# Patient Record
Sex: FEMALE | Race: BLACK OR AFRICAN AMERICAN | NOT HISPANIC OR LATINO
[De-identification: names, ages, dates, MRNs, and addresses within clinical notes are randomized per-mention and may not be internally consistent; named-entity substitution may affect disease eponyms.]

---

## 2018-10-01 PROBLEM — Z00.00 ENCOUNTER FOR PREVENTIVE HEALTH EXAMINATION: Status: ACTIVE | Noted: 2018-10-01

## 2018-10-15 ENCOUNTER — OTHER (OUTPATIENT)
Age: 33
End: 2018-10-15

## 2018-10-15 ENCOUNTER — APPOINTMENT (OUTPATIENT)
Dept: PLASTIC SURGERY | Facility: HOSPITAL | Age: 33
End: 2018-10-15

## 2018-10-15 ENCOUNTER — OUTPATIENT (OUTPATIENT)
Dept: OUTPATIENT SERVICES | Facility: HOSPITAL | Age: 33
LOS: 1 days | End: 2018-10-15

## 2018-10-15 VITALS — HEIGHT: 64 IN | BODY MASS INDEX: 45.14 KG/M2

## 2018-10-15 VITALS — DIASTOLIC BLOOD PRESSURE: 67 MMHG | SYSTOLIC BLOOD PRESSURE: 121 MMHG | WEIGHT: 263 LBS

## 2018-10-15 DIAGNOSIS — Z80.3 FAMILY HISTORY OF MALIGNANT NEOPLASM OF BREAST: ICD-10-CM

## 2018-10-16 PROBLEM — Z80.3 FAMILY HISTORY OF MALIGNANT NEOPLASM OF BREAST: Status: ACTIVE | Noted: 2018-10-16

## 2018-10-17 DIAGNOSIS — N62 HYPERTROPHY OF BREAST: ICD-10-CM

## 2018-10-20 ENCOUNTER — APPOINTMENT (OUTPATIENT)
Dept: MAMMOGRAPHY | Facility: CLINIC | Age: 33
End: 2018-10-20
Payer: MEDICAID

## 2018-10-20 ENCOUNTER — OUTPATIENT (OUTPATIENT)
Dept: OUTPATIENT SERVICES | Facility: HOSPITAL | Age: 33
LOS: 1 days | End: 2018-10-20
Payer: COMMERCIAL

## 2018-10-20 DIAGNOSIS — N62 HYPERTROPHY OF BREAST: ICD-10-CM

## 2018-10-20 PROCEDURE — 77063 BREAST TOMOSYNTHESIS BI: CPT

## 2018-10-20 PROCEDURE — 77067 SCR MAMMO BI INCL CAD: CPT

## 2018-10-20 PROCEDURE — 77063 BREAST TOMOSYNTHESIS BI: CPT | Mod: 26

## 2018-10-20 PROCEDURE — 77067 SCR MAMMO BI INCL CAD: CPT | Mod: 26

## 2018-12-11 ENCOUNTER — OUTPATIENT (OUTPATIENT)
Dept: OUTPATIENT SERVICES | Facility: HOSPITAL | Age: 33
LOS: 1 days | End: 2018-12-11

## 2018-12-11 VITALS
TEMPERATURE: 99 F | SYSTOLIC BLOOD PRESSURE: 104 MMHG | HEIGHT: 64 IN | RESPIRATION RATE: 16 BRPM | OXYGEN SATURATION: 98 % | WEIGHT: 263.89 LBS | DIASTOLIC BLOOD PRESSURE: 74 MMHG | HEART RATE: 83 BPM

## 2018-12-11 DIAGNOSIS — N62 HYPERTROPHY OF BREAST: ICD-10-CM

## 2018-12-11 LAB
HCG SERPL-ACNC: < 5 MIU/ML — SIGNIFICANT CHANGE UP
HCT VFR BLD CALC: 40.3 % — SIGNIFICANT CHANGE UP (ref 34.5–45)
HGB BLD-MCNC: 12.7 G/DL — SIGNIFICANT CHANGE UP (ref 11.5–15.5)
MCHC RBC-ENTMCNC: 26.3 PG — LOW (ref 27–34)
MCHC RBC-ENTMCNC: 31.5 % — LOW (ref 32–36)
MCV RBC AUTO: 83.6 FL — SIGNIFICANT CHANGE UP (ref 80–100)
NRBC # FLD: 0 — SIGNIFICANT CHANGE UP
PLATELET # BLD AUTO: 332 K/UL — SIGNIFICANT CHANGE UP (ref 150–400)
PMV BLD: 10.5 FL — SIGNIFICANT CHANGE UP (ref 7–13)
RBC # BLD: 4.82 M/UL — SIGNIFICANT CHANGE UP (ref 3.8–5.2)
RBC # FLD: 13.9 % — SIGNIFICANT CHANGE UP (ref 10.3–14.5)
WBC # BLD: 6.03 K/UL — SIGNIFICANT CHANGE UP (ref 3.8–10.5)
WBC # FLD AUTO: 6.03 K/UL — SIGNIFICANT CHANGE UP (ref 3.8–10.5)

## 2018-12-11 NOTE — H&P PST ADULT - NSANTHOSAYNRD_GEN_A_CORE
No. ROB screening performed.  STOP BANG Legend: 0-2 = LOW Risk; 3-4 = INTERMEDIATE Risk; 5-8 = HIGH Risk

## 2018-12-11 NOTE — H&P PST ADULT - FAMILY HISTORY
Father  Still living? Yes, Estimated age: 65  Hypertension, Age at diagnosis: Age Unknown  Family history of AICD (automatic internal cardiac defibrillator), Age at diagnosis: Age Unknown     Mother  Still living? No  Hypertension, Age at diagnosis: Age Unknown  Cerebrovascular accident, Age at diagnosis: Age Unknown     Grandparent  Still living? No  Cerebrovascular accident, Age at diagnosis: Age Unknown

## 2018-12-11 NOTE — H&P PST ADULT - PROBLEM SELECTOR PLAN 1
Pt. is scheduled for ALBINA breast reduction 12/20/18.  Attempted to speak with Dr. Dozier regarding BMI of 45.3.  Spoke with the [facility] Anesthesiologist Dr. Sweeney.  She stated the surgery has to be done in the main OR.  Attempted to notify the Surgical Coordinatior, Andressa.  Detailed message left with Natasha at the  in the Surgeon's office.

## 2019-01-04 NOTE — H&P PST ADULT - HEART RATE (BEATS/MIN)
Your Child’s Health  Girls over 12      An Richey  January 4, 2019    Visit Vitals  BP 96/58   Pulse 74   Ht 5' 2\" (1.575 m)   Wt 44.7 kg   LMP 12/04/2018   BMI 18.03 kg/m²     Weight: 98.6 lbs      Weight: Adolescence begins about age 10 in girls and ends somewhere in the early twenties. During this time, many changes occur.    BODY CHANGES:  There is no other time of life, except in the first year, when your body grows so rapidly. This is called a \"growth spurt\" and usually precedes and accompanies changes in your body shape as well as changes in your sexual organs. Bones and muscles grow rapidly, fat is added and distributed in an adult manner, and the result is a rapid increase in height and weight. It may even take a while for your coordination and skills to catch up with your rapidly changing body. Your hands and feet are the first to grow, followed by lengthening of your arms and legs. Finally, your spine grows and your rib cage and your pelvis widen.    Various hormones secreted by the endocrine glands lead to development. The first major change is breast development. The second is usually pubic hair (hair between the legs), followed by body odor, hair in the armpits, and finally menstruation (periodic bleeding or periods). In some families, the hair and odor precede the development of the breast tissue.  The whole process takes about two years, although in some cases it may take as long as four or five years.    SEXUAL DEVELOPMENT AND MENSTRUATION:  Breast development may normally begin at any time after the age of 8. One side may begin to grow several months before the other, and unevenness is common, especially in the first year. About two-thirds of adult women are somewhat unequal from one side to the other; small differences are insignificant, but large differences may cause considerable worry later. Bloody or milky discharge from the breast is never normal. Please let us know if this  occurs.    Girls whose breasts begin to develop early will usually grow early, as compared to their friends. Girls who begin to develop later than average (14 years and up) will continue to grow after their friends have stopped.    The second sign of external development is growth of pubic hair, the third is growth of underarm hair, and the last is menstruation. Menstruation occurs about two years after breast development starts.  It may begin earlier or, in rare cases, as much as five years later.  Let us know if you have monthly lower abdominal discomfort or pain without any blood.  Many girls will have a clear mucous vaginal discharge prior to their periods. This is normal, as long as it does not itch, burn, or have a strong odor.    Often, menstrual periods occur irregularly at first. With time, they usually become more regular.  \"Cramps\" can occur in the lower abdomen or in the back. They are usually mild and sometimes increase in severity with age. Cramps are the result of an excess of the muscle contraction chemical in the uterus (womb). They are not a sign of weakness or low pain tolerance.  Acetaminophen or ibuprofen are often helpful. The newer ibuprofen medicines help 85% of girls. Sometimes a prescribed medication may be needed if these over-the-counter medications are not adequate.    Some women experience Premenstrual Syndrome (PMS), which is characterized by bloating, swollen or tender breasts, headaches, depression, or irritability. These symptoms occur prior to and at the beginning of a menstrual period. This is unusual in teens and should be discussed with your doctor if it occurs.    Tampons are generally safe, comfortable, and easy to use, but adolescents who use tampons should be aware of the small risk of Toxic Shock Syndrome. This results from a bacterial infection that may occur in tampon users who leave tampons in for more than four hours.  Symptoms include high fever, headache, vomiting,  diarrhea, rash, and shock. Should these symptoms occur while you are using a tampon, you should remove it and notify your doctor immediately. If you would like to use tampons but feel uncomfortable asking your mother how they are properly used, a female healthcare worker can demonstrate this for you.    There is a lot of normal variability in period timing. However, you should make an appointment if the time between the start of one period and the start of the next is less than three weeks, or if the time between periods is more than 3 months. Other signs that an exam may be needed are very short periods of two days or less, uncontrollable cramps, or any risk of pregnancy or venereal disease.    Once you start having periods, it is physically possible to get pregnant and have a baby. Becoming pregnant is not a decision to be taken lightly. There is no sure way to avoid pregnancy or venereal disease except to avoid sexual contact. If 100 girls take the best known birth control and follow the directions perfectly, one will get pregnant each year.    Girls get venereal diseases from boys more easily than boys get them from girls, so girls have to be more careful. No matter what anybody says, AIDS is fatal and a cure in the near future is not likely. Please do not take chances.    SKIN PROBLEMS:  Acne affects 90% of teens. It often starts earlier in girls than in boys, and it is generally at its worst around 15-16 years of age. There are a lot of exceptions. Call us if the over-the-counter medications are not helping. Remember that washing too frequently, scrubbing, or drying vigorously may convert blackheads into white or red pimples. Benzoyl peroxide is the most effective ingredient of those available over the counter.    Most of us are born without brown spots on our skin (also called moles or wens). By the time we are 20 years old, the average person has about 40 such spots. They are normal as long as they remain  brown, have a sharp edge, and remain smaller than the diameter of a pencil eraser.  If you have moles with any other characteristics, please show them to your doctor. We would especially like to see moles that have been present since birth. Please also let us know if there is skin cancer in your family. Skin cancer almost never occurs under 10 years of age, but there are a few cases in the teens. The most dangerous skin cancers occur in people who have had sunburn, especially repeatedly, which is why we recommend sunscreens and protective clothing.    DIET:  It is important to maintain a balanced diet. Calcium, most readily available in dairy products, is particularly important for strong bones. If you cannot drink 3 glasses of milk a day without stomachaches, please tell us.  Limit your intake of fatty snacks (chips, fries, etc.).  Avoid eating in front of the TV or while preoccupied with music or videos. You often eat more than you really want when distracted. It is always important to eat as wide a variety of fruits and vegetables as you can. If you drink less than 32 ounces of milk per day, you should take a multivitamin with 400 units of vitamin D.    Adolescent girls also need iron in their diets. Menstruation depletes your iron supply. Iron, found in red meats and dark green vegetables, is necessary as part of your daily diet to prevent anemia.    SAFETY:  Car accidents and gunshot wounds are major killers in your age range. Your reflexes are quick, but seatbelts are still necessary when you ride in a car. \"Friends\" who drive recklessly, drink and drive, use drugs, or play with guns or knives are being unreasonable and dangerous. No one wants to be called a \"chicken,\" but standing up to being called a chicken in front of others is braver and safer.    Backpacks may cause neck strain or weakening of the arms if they exceed 15% of your weight or are carried for long periods over just one shoulder.    SUBSTANCE  ABUSE:  [place] unfortunately leads the nation in drinking and binge drinking.  Remember that every beer is 120-150 calories. If you want information about alcohol, tobacco, or drugs for yourself or a friend, please ask us. We will be glad to talk with you or mail information to you. It is very hard to stop smoking once you start. Please do not start.    RESPECT AND DATING:  When boys and girls get together, both the girls and the boys are trying to make a good impression. Some people can make a good impression just by being themselves, while others may try a little too hard to please. When you begin dating, you will probably want to date with a group of friends or date where an adult is within calling distance.  You are entitled to choose whom you would like to date and where.  At your age, most boys are becoming physically stronger. Some have come to believe from stories they have heard that girls like to be treated roughly. NO ONE HAS THE RIGHT TO HURT YOU.    Watch how your date treats his friends and family.  If he is mean to everyone else, be careful.  Despite what you see on TV or hear from your friends, most girls do not spend a night alone with a boy until after high school.  You are a good person who should have the confidence to expect friends (male or female) to treat you with respect.  Please tell your parents or tell us if someone has hurt you.    MEDICATIONS:  Once you are 12 or older, you can generally take adult doses of over-the-counter medications. However, you should not take extra-strength doses of acetaminophen (for example, 500 mg Tylenol tablets) unless you weigh more than 140 pounds.    MEDICATION FOR FEVER OR PAIN:   Acetaminophen liquid (for example, Tylenol or Tempra) may be given every four hours as needed for pain or fever.  Acetaminophen liquid is less concentrated than the infant dropper bottle type.    CHILDREN’S Tylenol/Acetaminophen  (160 mg/5 mL)    Child’s Weight:  Dose:  36 -  47 pounds:    240 mg (7.5 mL (1 1/2 Teaspoons))  48 - 59 pounds:    320 mg (10.0 mL (2 Teaspoons))  60 - 71 pounds:    400 mg (12.5 mL (2 1/2 Teaspoons))  72 - 95 pounds:    480 mg (15.0 mL (3 Teaspoons))  Greater than 96 pounds:   640 mg (20.0 mL (4 Teaspoons))    CHILDREN’S Tylenol/Acetaminophen MELTAWAYS (80 mg tablets)    Child’s Weight:  Dose:  36 - 47 pounds:    240 mg (3 meltaway tablets)  48 - 59 pounds:    320 mg (4 meltaway tablets)  60 - 71 pounds:    400 mg (5 meltaway tablets)  72 - 95 pounds:    480 mg (6 meltaway tablets)  Greater than 96 pounds:   640 mg (8 meltaway tablets)    Mike (JrAbigail) Tylenol/Acetaminophen MELTAWAYS (160 mg tablets)    Child’s Weight:  Dose:  36 - 47 pounds:    240 mg (1 1/2 meltaway tablets)  48 - 59 pounds:    320 mg (2 meltaway tablets)  60 - 71 pounds:    400 mg (2 1/2 meltaway tablets)  72 - 95 pounds:    480 mg (3 meltaway tablets)  Greater than 96 pounds:   640 mg (4 meltaway tablets)           NEXT VISIT: 1 year  Thank you for entrusting your care to [facility].                 83

## 2019-01-12 PROBLEM — E66.01 MORBID (SEVERE) OBESITY DUE TO EXCESS CALORIES: Chronic | Status: ACTIVE | Noted: 2018-12-11

## 2019-01-12 PROBLEM — N62 HYPERTROPHY OF BREAST: Chronic | Status: ACTIVE | Noted: 2018-12-11

## 2019-01-15 ENCOUNTER — OUTPATIENT (OUTPATIENT)
Dept: OUTPATIENT SERVICES | Facility: HOSPITAL | Age: 34
LOS: 1 days | End: 2019-01-15

## 2019-01-15 VITALS
TEMPERATURE: 98 F | HEIGHT: 65.5 IN | SYSTOLIC BLOOD PRESSURE: 110 MMHG | RESPIRATION RATE: 16 BRPM | HEART RATE: 78 BPM | DIASTOLIC BLOOD PRESSURE: 70 MMHG | WEIGHT: 261.91 LBS

## 2019-01-15 DIAGNOSIS — K08.409 PARTIAL LOSS OF TEETH, UNSPECIFIED CAUSE, UNSPECIFIED CLASS: Chronic | ICD-10-CM

## 2019-01-15 DIAGNOSIS — N62 HYPERTROPHY OF BREAST: ICD-10-CM

## 2019-01-15 RX ORDER — MEDROXYPROGESTERONE ACETATE 150 MG/ML
0 INJECTION, SUSPENSION, EXTENDED RELEASE INTRAMUSCULAR
Qty: 0 | Refills: 0 | COMMUNITY

## 2019-01-15 NOTE — H&P PST ADULT - PROBLEM SELECTOR PLAN 1
Pt. is scheduled for ALBINA breast reduction 12/20/18.  Attempted to speak with Dr. Dozier regarding BMI of 45.3.  Spoke with the [facility] Anesthesiologist Dr. Sweeney.  She stated the surgery has to be done in the main OR.  Attempted to notify the Surgical Coordinatior, Andressa.  Detailed message left with Natasha at the  in the Surgeon's office. Pt. is scheduled for ALBINA breast reduction 1/30/19  preop instructions, gi prophylaxis & surgical soap given  pt verbal understanding  ucg results pending

## 2019-01-15 NOTE — H&P PST ADULT - ATTENDING COMMENTS
Patient seen.   Gigantomastia and grade 3 ptosis.   Plan for BBR with free nipple grafts.     Risks of bleeding, infection, hematoma, seroma, skin necrosis, failure of NAC graft take, asymmetry, anesthestic risks reviewed.

## 2019-01-15 NOTE — H&P PST ADULT - ASSESSMENT
Pt. is a 34 yo female accompanied by her significant other.  Pt. has ALBINA breast hypertrophy. Pt. is a 34 yo female with B/l breast hypertrophy.

## 2019-01-15 NOTE — H&P PST ADULT - HISTORY OF PRESENT ILLNESS
Pt. is a 34 yo female with ALBINA shoulder pain and back pain due to the large size of her breasts. 34 yo female with ALBINA shoulder pain and back pain due to the large size of her breasts.  Scheduled b/l breast reduction on 1/30/2019 32 yo female with ALBINA shoulder pain and back pain due to the large size of her breasts.  Scheduled b/l breast reduction on 1/30/2019.  Per pt was scheduled in December 2018, surgeon cancelled. 32 yo female with ALBINA shoulder pain and back pain due to the large size of her breasts.  Scheduled b/l breast reduction on 1/30/2019.  Per pt was scheduled in December 2018, insurance was not approved.

## 2019-01-15 NOTE — H&P PST ADULT - GENERAL
Medication:  methylpheniDATE ER (CONCERTA) 36 MG CR tablet.     Pharmacy has been verified.    [] Patient completely out of medication     Patient currently has follow up appointment scheduled    Message to Prescriber:   
Request for:   methylpheniDATE ER (CONCERTA) 36 MG CR tablet Take 1 tablet by mouth every morning.   Last prescribed: 9/11/18   #30   Refills: 0    Next: 1/15/19  No show(s)/pt cancel: 0  Last:  9/11/18    Verified dose against providers last note.    Per PDMP last filled 9/11/18 #30  PDMP review: Criteria met. Forwarded to Physician/ROBIN for approval/signature.           
negative

## 2019-01-29 ENCOUNTER — TRANSCRIPTION ENCOUNTER (OUTPATIENT)
Age: 34
End: 2019-01-29

## 2019-01-29 RX ORDER — SODIUM CHLORIDE 9 MG/ML
1000 INJECTION, SOLUTION INTRAVENOUS
Qty: 0 | Refills: 0 | Status: DISCONTINUED | OUTPATIENT
Start: 2019-01-30 | End: 2019-02-14

## 2019-01-30 ENCOUNTER — RESULT REVIEW (OUTPATIENT)
Age: 34
End: 2019-01-30

## 2019-01-30 ENCOUNTER — OUTPATIENT (OUTPATIENT)
Dept: OUTPATIENT SERVICES | Facility: HOSPITAL | Age: 34
LOS: 1 days | Discharge: HOME | End: 2019-01-30
Payer: MEDICAID

## 2019-01-30 VITALS
RESPIRATION RATE: 16 BRPM | HEIGHT: 65.5 IN | WEIGHT: 261.91 LBS | OXYGEN SATURATION: 100 % | SYSTOLIC BLOOD PRESSURE: 122 MMHG | TEMPERATURE: 98 F | HEART RATE: 81 BPM | DIASTOLIC BLOOD PRESSURE: 75 MMHG

## 2019-01-30 VITALS — OXYGEN SATURATION: 100 % | RESPIRATION RATE: 21 BRPM | HEART RATE: 83 BPM

## 2019-01-30 DIAGNOSIS — N62 HYPERTROPHY OF BREAST: ICD-10-CM

## 2019-01-30 DIAGNOSIS — K08.409 PARTIAL LOSS OF TEETH, UNSPECIFIED CAUSE, UNSPECIFIED CLASS: Chronic | ICD-10-CM

## 2019-01-30 LAB — HCG UR QL: NEGATIVE — SIGNIFICANT CHANGE UP

## 2019-01-30 PROCEDURE — 88305 TISSUE EXAM BY PATHOLOGIST: CPT | Mod: 26

## 2019-01-30 RX ORDER — ONDANSETRON 8 MG/1
4 TABLET, FILM COATED ORAL ONCE
Qty: 0 | Refills: 0 | Status: DISCONTINUED | OUTPATIENT
Start: 2019-01-30 | End: 2019-02-14

## 2019-01-30 RX ORDER — HYDROMORPHONE HYDROCHLORIDE 2 MG/ML
0.5 INJECTION INTRAMUSCULAR; INTRAVENOUS; SUBCUTANEOUS
Qty: 0 | Refills: 0 | Status: DISCONTINUED | OUTPATIENT
Start: 2019-01-30 | End: 2019-01-30

## 2019-01-30 RX ADMIN — HYDROMORPHONE HYDROCHLORIDE 0.5 MILLIGRAM(S): 2 INJECTION INTRAMUSCULAR; INTRAVENOUS; SUBCUTANEOUS at 18:54

## 2019-01-30 RX ADMIN — HYDROMORPHONE HYDROCHLORIDE 0.5 MILLIGRAM(S): 2 INJECTION INTRAMUSCULAR; INTRAVENOUS; SUBCUTANEOUS at 19:15

## 2019-01-30 RX ADMIN — HYDROMORPHONE HYDROCHLORIDE 0.5 MILLIGRAM(S): 2 INJECTION INTRAMUSCULAR; INTRAVENOUS; SUBCUTANEOUS at 19:33

## 2019-01-30 RX ADMIN — HYDROMORPHONE HYDROCHLORIDE 0.5 MILLIGRAM(S): 2 INJECTION INTRAMUSCULAR; INTRAVENOUS; SUBCUTANEOUS at 19:54

## 2019-01-30 NOTE — ASU DISCHARGE PLAN (ADULT/PEDIATRIC). - FOLLOWUP APPOINTMENT CLINIC/PHYSICIAN
Please follow up at the [facility] on MONDAY, FEBRUARY 4. You may call [phone] to schedule an appointment.

## 2019-01-30 NOTE — ASU DISCHARGE PLAN (ADULT/PEDIATRIC). - SPECIAL INSTRUCTIONS
GENERAL INSTRUCTIONS:  >> Please follow the instruction sheet given to you by Dr. Gonzales    WOUND CARE:  >> You have a special dressing (called Dermabond Prineo) over your surgical incision(s). Do NOT remove this dressing. Over the next few weeks, this dressing will come off on its own or will be removed in the office.   >> You also have another special dressing which is yellow. Do NOT remove this dressing.    DRAIN CARE:  >> You will be discharged with BEBE drains. You will need to empty them and record outputs accurately. This will be taught to you by the nursing staff. Please do not remove the BEBE drains. They will be removed in the office. Please bring to the office accurate records of output.

## 2019-01-30 NOTE — ASU DISCHARGE PLAN (ADULT/PEDIATRIC). - NOTIFY
Persistent Nausea and Vomiting/Fever greater than 101/Inability to Tolerate Liquids or Foods/Pain not relieved by Medications/Bleeding that does not stop

## 2019-01-30 NOTE — ASU DISCHARGE PLAN (ADULT/PEDIATRIC). - MEDICATION SUMMARY - MEDICATIONS TO TAKE
I will START or STAY ON the medications listed below when I get home from the hospital:    oxyCODONE-acetaminophen 5 mg-325 mg oral tablet  -- 1-2 tab(s) by mouth every 4 hours, As Needed -for moderate pain MDD:10 Tablets   -- Caution federal law prohibits the transfer of this drug to any person other  than the person for whom it was prescribed.  May cause drowsiness.  Alcohol may intensify this effect.  Use care when operating dangerous machinery.  This prescription cannot be refilled.  This product contains acetaminophen.  Do not use  with any other product containing acetaminophen to prevent possible liver damage.  Using more of this medication than prescribed may cause serious breathing problems.    -- Indication: For Pain    Larissia 100 mcg-20 mcg oral tablet  -- 1 tab(s) by mouth once a day  -- Indication: For Home Medication

## 2019-01-30 NOTE — BRIEF OPERATIVE NOTE - OPERATION/FINDINGS
Preoperative Diagnosis: Symptomatic macromastia  Intraoperative Findings: >3kg removed from each breast  Procedure: Bilateral breast reduction (with planned bilateral free NAC graft)  Postoperative Diagnosis: Symptomatic macromastia

## 2019-02-04 ENCOUNTER — APPOINTMENT (OUTPATIENT)
Dept: PLASTIC SURGERY | Facility: HOSPITAL | Age: 34
End: 2019-02-04
Payer: MEDICAID

## 2019-02-04 ENCOUNTER — OUTPATIENT (OUTPATIENT)
Dept: OUTPATIENT SERVICES | Facility: HOSPITAL | Age: 34
LOS: 1 days | End: 2019-02-04

## 2019-02-04 VITALS
HEIGHT: 64 IN | SYSTOLIC BLOOD PRESSURE: 128 MMHG | BODY MASS INDEX: 42 KG/M2 | WEIGHT: 246 LBS | HEART RATE: 100 BPM | DIASTOLIC BLOOD PRESSURE: 82 MMHG

## 2019-02-04 DIAGNOSIS — K08.409 PARTIAL LOSS OF TEETH, UNSPECIFIED CAUSE, UNSPECIFIED CLASS: Chronic | ICD-10-CM

## 2019-02-04 PROCEDURE — 99024 POSTOP FOLLOW-UP VISIT: CPT

## 2019-02-04 NOTE — ASSESSMENT
[FreeTextEntry1] : 32 y/o F s/p bilateral breast reduction mammaplasty with free nipple grafts 1/30. Healing appropriately. Nipple grafts with good take. No sign of infection. Drains were removed at this visit. Provided new surgical bra.

## 2019-02-04 NOTE — PHYSICAL EXAM
[NI] : Normal [de-identified] : B [de-identified] : Bilateral breast reduction incisions clean, dry, intact with prineo dressing in place. Mild giovanna-incisional erythema. No drainage from incision. No appreciable collections. Free nipple grafts with 100% take bilaterally. Drains with scant serosanguinous output.

## 2019-02-04 NOTE — HISTORY OF PRESENT ILLNESS
[FreeTextEntry1] : 34 y/o F s/p bilateral breast reduction with free nipple grafts on 01/30/2019. Patient has been doing well. Pain controlled. No longer requiring pain medication. Drain output has been 1-3/ mL per 8 hrs for the last several days. No fever. No chills. No strikethrough through dressing. Has been wearing surgical bra and keeping dressings dry and in place.

## 2019-02-06 LAB — SURGICAL PATHOLOGY STUDY: SIGNIFICANT CHANGE UP

## 2019-02-11 ENCOUNTER — OUTPATIENT (OUTPATIENT)
Dept: OUTPATIENT SERVICES | Facility: HOSPITAL | Age: 34
LOS: 1 days | End: 2019-02-11

## 2019-02-11 ENCOUNTER — APPOINTMENT (OUTPATIENT)
Dept: PLASTIC SURGERY | Facility: HOSPITAL | Age: 34
End: 2019-02-11

## 2019-02-11 VITALS
SYSTOLIC BLOOD PRESSURE: 108 MMHG | BODY MASS INDEX: 42 KG/M2 | HEART RATE: 72 BPM | DIASTOLIC BLOOD PRESSURE: 72 MMHG | HEIGHT: 64 IN | WEIGHT: 246 LBS

## 2019-02-11 DIAGNOSIS — K08.409 PARTIAL LOSS OF TEETH, UNSPECIFIED CAUSE, UNSPECIFIED CLASS: Chronic | ICD-10-CM

## 2019-02-11 NOTE — PHYSICAL EXAM
[de-identified] : RIGHT BREAST:\par NAC graft superficial epidermolysis. Dermis viable underneath.\par No masses, collections, or evidence of infection. \par Prineo in place. \par \par LEFT BREAST:\par NAC graft superficial epidermolysis. Dermis viable underneath.\par No masses, collections, or evidence of infection. \par Prineo in place.

## 2019-02-11 NOTE — ASSESSMENT
[FreeTextEntry1] : 33F s/p bilateral breast reduction approximately x2 weeks ago (DOS: 01/30/2019).\par >> Patient is doing well. \par >> Patient is very happy with the appearance of her breasts. \par >> Aquaphor or Vitamin A/D ointment (with Telfa) to bilateral NAC grafts two times per day.\par >> Plan to leave Prineo dressing intact for x2 more weeks in order to improve overall appearance of scars. Patient has been instructed to gradually trim Prineo if it comes off before her next appointment. If the Prineo dressing comes off and the incision becomes exposed, the patient has been given a silicone scar ointment to apply once daily. \par >> Follow up in PRS clinic in x2 weeks.\par >> Follow up with Dr. Bird in x2 weeks.\par \par >> The patient has been discussed with Dr. Diamond and Dr. Bird who both agree with the above plan of care.

## 2019-02-11 NOTE — HISTORY OF PRESENT ILLNESS
[FreeTextEntry1] : 33F s/p bilateral breast reduction approximately x2 weeks ago (DOS: 01/30/2019).\par \par Patient is doing well. Pain controlled. No complaints. \par \par Seen today with patient's spouse.

## 2019-02-12 DIAGNOSIS — N62 HYPERTROPHY OF BREAST: ICD-10-CM

## 2019-02-25 ENCOUNTER — APPOINTMENT (OUTPATIENT)
Dept: PLASTIC SURGERY | Facility: HOSPITAL | Age: 34
End: 2019-02-25

## 2019-02-25 ENCOUNTER — OUTPATIENT (OUTPATIENT)
Dept: OUTPATIENT SERVICES | Facility: HOSPITAL | Age: 34
LOS: 1 days | End: 2019-02-25

## 2019-02-25 VITALS
BODY MASS INDEX: 42.68 KG/M2 | HEART RATE: 86 BPM | WEIGHT: 250 LBS | HEIGHT: 64 IN | DIASTOLIC BLOOD PRESSURE: 71 MMHG | SYSTOLIC BLOOD PRESSURE: 107 MMHG

## 2019-02-25 DIAGNOSIS — K08.409 PARTIAL LOSS OF TEETH, UNSPECIFIED CAUSE, UNSPECIFIED CLASS: Chronic | ICD-10-CM

## 2019-02-25 DIAGNOSIS — N62 HYPERTROPHY OF BREAST: ICD-10-CM

## 2019-02-25 NOTE — HISTORY OF PRESENT ILLNESS
[FreeTextEntry1] : 33F s/p bilateral breast reduction on 01/30/2019. The patient is approximately 4 weeks postop. \par \par The patient is doing well. No complaints. Pain controlled.\par \par The patient is very happy with her procedure and with the results. She has been looking for new bathing suits. She is excited for the spring and summer seasons.

## 2019-02-25 NOTE — ASSESSMENT
[FreeTextEntry1] : 33F s/p bilateral breast reduction approximately x4 weeks ago (DOS: 01/30/2019).\par >> Patient is doing well. Patient is very happy with the appearance of her breasts. \par >> Continue Aquaphor or Vitamin A/D ointment (with Telfa) BID to superior aspect of left NAC until small wound well healed.\par >> Begin placement of silicone gel scar ointment once daily to the vertical and IMF incisions. \par >> I have discussed with the patient the potential for tattooing of the NACs at approximately 6 months postop. The patient is very interested.\par >> Follow up in PRS clinic in x2 months (i.e., at 3 months postop).\par \par \par >> The patient has been discussed with Dr. Weeks who agrees with the above plan of care. \par

## 2019-02-25 NOTE — PHYSICAL EXAM
[NI] : Normal [de-identified] : A [de-identified] : Breasts: RIGHT BREAST:\par NAC graft pink and depigmented. Incision well healed.\par No masses, collections, or evidence of infection. \par Prineo removed. Incision intact and well-healed.\par \par LEFT BREAST:\par NAC graft pink and depigmented. Incision well healed except at 12 o'clock position where a suture had extruded and a small, clean 3mm wound was noted. \par No masses, collections, or evidence of infection. \par Prineo removed. Incision intact and well-healed.

## 2019-03-01 DIAGNOSIS — N62 HYPERTROPHY OF BREAST: ICD-10-CM

## 2019-05-20 ENCOUNTER — APPOINTMENT (OUTPATIENT)
Dept: PLASTIC SURGERY | Facility: HOSPITAL | Age: 34
End: 2019-05-20

## 2019-05-20 ENCOUNTER — OUTPATIENT (OUTPATIENT)
Dept: OUTPATIENT SERVICES | Facility: HOSPITAL | Age: 34
LOS: 1 days | End: 2019-05-20

## 2019-05-20 VITALS
SYSTOLIC BLOOD PRESSURE: 106 MMHG | HEIGHT: 64 IN | DIASTOLIC BLOOD PRESSURE: 65 MMHG | WEIGHT: 255 LBS | HEART RATE: 91 BPM | BODY MASS INDEX: 43.54 KG/M2

## 2019-05-20 DIAGNOSIS — K08.409 PARTIAL LOSS OF TEETH, UNSPECIFIED CAUSE, UNSPECIFIED CLASS: Chronic | ICD-10-CM

## 2019-05-20 NOTE — HISTORY OF PRESENT ILLNESS
[FreeTextEntry1] : 33F s/p b/l breast reduction with removal of >3Kg breast tissue from each breast with free nipple graft.  Patient states she feels well, has had improvement and near resolution of back pain and shoulder grooving.  Reports incisions are well healed.  Some pigment loss to bilateral areolae has improved with time. She notes that along the lateral aspect of the right breast IMF incision she occasionally feels a "pinching" sensation - she cannot identify any inciting factors but states it is not particularly bothersome.

## 2019-05-20 NOTE — ASSESSMENT
[FreeTextEntry1] : 33F s/p b/l breast reduction with free nipple grafts, doing well 3 months post op\par - Recommend continuing with scar massage to improve cosmesis as well as desensitize tender scar. Scar massage technique demonstrated to patient \par - Patient informed that she may follow up as needed or with any additional concerns.  \par \par Photos taken.

## 2019-05-20 NOTE — PHYSICAL EXAM
[NI] : Normal [de-identified] : Bilateral breasts with good symmetry. No masses palpated on either breast. Incisions well healed, no evidence of hypertrophic scarring or keloiding.  B/l NAC with some central loss of pigment. no erythema or tenderness.\par \par Right IMF incision without evidence of spitting suture, tenderness to palpation, erythema, or infection.  Unable to palpate and masses or nodularities along IMF where patient indicated she had been experiencing discomfort.

## 2019-05-21 DIAGNOSIS — N62 HYPERTROPHY OF BREAST: ICD-10-CM

## 2019-05-30 NOTE — H&P PST ADULT - NS SC CAGE ALCOHOL ANNOYED YOU
How Severe Is Your Skin Lesion?: mild Has Your Skin Lesion Been Treated?: not been treated Is This A New Presentation, Or A Follow-Up?: Skin Lesions no

## 2019-08-12 NOTE — ASU PATIENT PROFILE, ADULT - NS PRO INFO GIVEN TO
patient Tetracycline Counseling: Patient counseled regarding possible photosensitivity and increased risk for sunburn.  Patient instructed to avoid sunlight, if possible.  When exposed to sunlight, patients should wear protective clothing, sunglasses, and sunscreen.  The patient was instructed to call the office immediately if the following severe adverse effects occur:  hearing changes, easy bruising/bleeding, severe headache, or vision changes.  The patient verbalized understanding of the proper use and possible adverse effects of tetracycline.  All of the patient's questions and concerns were addressed. Patient understands to avoid pregnancy while on therapy due to potential birth defects.

## 2020-01-12 ENCOUNTER — TRANSCRIPTION ENCOUNTER (OUTPATIENT)
Age: 35
End: 2020-01-12

## 2020-07-28 ENCOUNTER — OUTPATIENT (OUTPATIENT)
Dept: OUTPATIENT SERVICES | Facility: HOSPITAL | Age: 35
LOS: 1 days | End: 2020-07-28
Payer: COMMERCIAL

## 2020-07-28 VITALS
TEMPERATURE: 98 F | HEART RATE: 67 BPM | DIASTOLIC BLOOD PRESSURE: 78 MMHG | WEIGHT: 235.89 LBS | SYSTOLIC BLOOD PRESSURE: 121 MMHG | HEIGHT: 64 IN | RESPIRATION RATE: 16 BRPM

## 2020-07-28 DIAGNOSIS — K08.409 PARTIAL LOSS OF TEETH, UNSPECIFIED CAUSE, UNSPECIFIED CLASS: Chronic | ICD-10-CM

## 2020-07-28 DIAGNOSIS — Z29.9 ENCOUNTER FOR PROPHYLACTIC MEASURES, UNSPECIFIED: ICD-10-CM

## 2020-07-28 DIAGNOSIS — Z98.890 OTHER SPECIFIED POSTPROCEDURAL STATES: Chronic | ICD-10-CM

## 2020-07-28 DIAGNOSIS — Z64.1 PROBLEMS RELATED TO MULTIPARITY: ICD-10-CM

## 2020-07-28 DIAGNOSIS — Z01.818 ENCOUNTER FOR OTHER PREPROCEDURAL EXAMINATION: ICD-10-CM

## 2020-07-28 LAB
ANION GAP SERPL CALC-SCNC: 10 MMOL/L — SIGNIFICANT CHANGE UP (ref 5–17)
BASOPHILS # BLD AUTO: 0.03 K/UL — SIGNIFICANT CHANGE UP (ref 0–0.2)
BASOPHILS NFR BLD AUTO: 0.5 % — SIGNIFICANT CHANGE UP (ref 0–2)
BUN SERPL-MCNC: 7 MG/DL — LOW (ref 8–20)
CALCIUM SERPL-MCNC: 9.2 MG/DL — SIGNIFICANT CHANGE UP (ref 8.6–10.2)
CHLORIDE SERPL-SCNC: 101 MMOL/L — SIGNIFICANT CHANGE UP (ref 98–107)
CO2 SERPL-SCNC: 27 MMOL/L — SIGNIFICANT CHANGE UP (ref 22–29)
CREAT SERPL-MCNC: 0.81 MG/DL — SIGNIFICANT CHANGE UP (ref 0.5–1.3)
EOSINOPHIL # BLD AUTO: 0.35 K/UL — SIGNIFICANT CHANGE UP (ref 0–0.5)
EOSINOPHIL NFR BLD AUTO: 5.6 % — SIGNIFICANT CHANGE UP (ref 0–6)
GLUCOSE SERPL-MCNC: 94 MG/DL — SIGNIFICANT CHANGE UP (ref 70–99)
HCT VFR BLD CALC: 38.8 % — SIGNIFICANT CHANGE UP (ref 34.5–45)
HGB BLD-MCNC: 12 G/DL — SIGNIFICANT CHANGE UP (ref 11.5–15.5)
IMM GRANULOCYTES NFR BLD AUTO: 0.2 % — SIGNIFICANT CHANGE UP (ref 0–1.5)
LYMPHOCYTES # BLD AUTO: 1.77 K/UL — SIGNIFICANT CHANGE UP (ref 1–3.3)
LYMPHOCYTES # BLD AUTO: 28.4 % — SIGNIFICANT CHANGE UP (ref 13–44)
MCHC RBC-ENTMCNC: 25.2 PG — LOW (ref 27–34)
MCHC RBC-ENTMCNC: 30.9 GM/DL — LOW (ref 32–36)
MCV RBC AUTO: 81.3 FL — SIGNIFICANT CHANGE UP (ref 80–100)
MONOCYTES # BLD AUTO: 0.48 K/UL — SIGNIFICANT CHANGE UP (ref 0–0.9)
MONOCYTES NFR BLD AUTO: 7.7 % — SIGNIFICANT CHANGE UP (ref 2–14)
NEUTROPHILS # BLD AUTO: 3.59 K/UL — SIGNIFICANT CHANGE UP (ref 1.8–7.4)
NEUTROPHILS NFR BLD AUTO: 57.6 % — SIGNIFICANT CHANGE UP (ref 43–77)
PLATELET # BLD AUTO: 355 K/UL — SIGNIFICANT CHANGE UP (ref 150–400)
POTASSIUM SERPL-MCNC: 4 MMOL/L — SIGNIFICANT CHANGE UP (ref 3.5–5.3)
POTASSIUM SERPL-SCNC: 4 MMOL/L — SIGNIFICANT CHANGE UP (ref 3.5–5.3)
RBC # BLD: 4.77 M/UL — SIGNIFICANT CHANGE UP (ref 3.8–5.2)
RBC # FLD: 17.2 % — HIGH (ref 10.3–14.5)
SODIUM SERPL-SCNC: 138 MMOL/L — SIGNIFICANT CHANGE UP (ref 135–145)
WBC # BLD: 6.23 K/UL — SIGNIFICANT CHANGE UP (ref 3.8–10.5)
WBC # FLD AUTO: 6.23 K/UL — SIGNIFICANT CHANGE UP (ref 3.8–10.5)

## 2020-07-28 PROCEDURE — 85027 COMPLETE CBC AUTOMATED: CPT

## 2020-07-28 PROCEDURE — 36415 COLL VENOUS BLD VENIPUNCTURE: CPT

## 2020-07-28 PROCEDURE — 80048 BASIC METABOLIC PNL TOTAL CA: CPT

## 2020-07-28 PROCEDURE — G0463: CPT

## 2020-07-28 NOTE — H&P PST ADULT - ASSESSMENT
CAPRINI SCORE [CLOT]    AGE RELATED RISK FACTORS                                                       MOBILITY RELATED FACTORS  [ ] Age 41-60 years                                            (1 Point)                  [ ] Bed rest                                                        (1 Point)  [ ] Age: 61-74 years                                           (2 Points)                 [ ] Plaster cast                                                   (2 Points)  [ ] Age= 75 years                                              (3 Points)                 [ ] Bed bound for more than 72 hours                 (2 Points)    DISEASE RELATED RISK FACTORS                                               GENDER SPECIFIC FACTORS  [x ] Edema in the lower extremities                       (1 Point)                  [ ] Pregnancy                                                     (1 Point)  [ ] Varicose veins                                               (1 Point)                  [ ] Post-partum < 6 weeks                                   (1 Point)             [x ] BMI > 25 Kg/m2                                            (1 Point)                  [ ] Hormonal therapy  or oral contraception          (1 Point)                 [ ] Sepsis (in the previous month)                        (1 Point)                  [ ] History of pregnancy complications                 (1 point)  [ ] Pneumonia or serious lung disease                                               [ ] Unexplained or recurrent                     (1 Point)           (in the previous month)                               (1 Point)  [ ] Abnormal pulmonary function test                     (1 Point)                 SURGERY RELATED RISK FACTORS  [ ] Acute myocardial infarction                              (1 Point)                 [ ]  Section                                             (1 Point)  [ ] Congestive heart failure (in the previous month)  (1 Point)               [ ] Minor surgery                                                  (1 Point)   [ ] Inflammatory bowel disease                             (1 Point)                 [ ] Arthroscopic surgery                                        (2 Points)  [ ] Central venous access                                      (2 Points)                [x ] General surgery lasting more than 45 minutes   (2 Points)       [ ] Stroke (in the previous month)                          (5 Points)               [ ] Elective arthroplasty                                         (5 Points)                                                                                                                                               HEMATOLOGY RELATED FACTORS                                                 TRAUMA RELATED RISK FACTORS  [ ] Prior episodes of VTE                                     (3 Points)                [ ] Fracture of the hip, pelvis, or leg                       (5 Points)  [ ] Positive family history for VTE                         (3 Points)                 [ ] Acute spinal cord injury (in the previous month)  (5 Points)  [ ] Prothrombin 82567 A                                     (3 Points)                 [ ] Paralysis  (less than 1 month)                             (5 Points)  [ ] Factor V Leiden                                             (3 Points)                  [ ] Multiple Trauma within 1 month                        (5 Points)  [ ] Lupus anticoagulants                                     (3 Points)                                                           [ ] Anticardiolipin antibodies                               (3 Points)                                                       [ ] High homocysteine in the blood                      (3 Points)                                             [ ] Other congenital or acquired thrombophilia      (3 Points)                                                [ ] Heparin induced thrombocytopenia                  (3 Points)                                          Total Score [  4    ]    Caprini Score 0 - 2:  Low Risk, No VTE Prophylaxis required for most patients, encourage ambulation  Caprini Score 3 - 6:  At Risk, pharmacologic VTE prophylaxis is indicated for most patients (in the absence of a contraindication)  Caprini Score Greater than or = 7:  High Risk, pharmacologic VTE prophylaxis is indicated for most patients (in the absence of a contraindication)    33 yo female  present for elective sterilization.  She is 10 week post partum.  She is schedule for laparoscopic bilateral salpingectomy.

## 2020-07-28 NOTE — H&P PST ADULT - NSICDXFAMILYHX_GEN_ALL_CORE_FT
FAMILY HISTORY:  Father  Still living? Yes, Estimated age: 65  Family history of AICD (automatic internal cardiac defibrillator), Age at diagnosis: Age Unknown  Hypertension, Age at diagnosis: Age Unknown    Mother  Still living? No  Cerebrovascular accident, Age at diagnosis: Age Unknown  Hypertension, Age at diagnosis: Age Unknown    Grandparent  Still living? No  Cerebrovascular accident, Age at diagnosis: Age Unknown

## 2020-07-28 NOTE — H&P PST ADULT - HISTORY OF PRESENT ILLNESS
33 yo female  present for elective sterilization.  She is 10 week post partum.  She is schedule for laparoscopic bilateral salpingectomy.

## 2020-07-28 NOTE — H&P PST ADULT - NSICDXPROBLEM_GEN_ALL_CORE_FT
PROBLEM DIAGNOSES  Problem: Multiparity  Assessment and Plan: Laparoscopic bilateral salpingectomy.      Problem: Need for prophylactic measure  Assessment and Plan: low risk the surgical team will order appropriate VTE prophylaxis

## 2020-07-29 ENCOUNTER — TRANSCRIPTION ENCOUNTER (OUTPATIENT)
Age: 35
End: 2020-07-29

## 2020-07-30 DIAGNOSIS — Z01.818 ENCOUNTER FOR OTHER PREPROCEDURAL EXAMINATION: ICD-10-CM

## 2020-08-08 ENCOUNTER — APPOINTMENT (OUTPATIENT)
Dept: DISASTER EMERGENCY | Facility: CLINIC | Age: 35
End: 2020-08-08

## 2020-08-09 LAB — SARS-COV-2 N GENE NPH QL NAA+PROBE: NOT DETECTED

## 2020-08-10 ENCOUNTER — TRANSCRIPTION ENCOUNTER (OUTPATIENT)
Age: 35
End: 2020-08-10

## 2020-08-11 ENCOUNTER — RESULT REVIEW (OUTPATIENT)
Age: 35
End: 2020-08-11

## 2020-08-11 ENCOUNTER — OUTPATIENT (OUTPATIENT)
Dept: OUTPATIENT SERVICES | Facility: HOSPITAL | Age: 35
LOS: 1 days | End: 2020-08-11
Payer: COMMERCIAL

## 2020-08-11 VITALS
TEMPERATURE: 98 F | WEIGHT: 237.88 LBS | HEIGHT: 64 IN | OXYGEN SATURATION: 99 % | HEART RATE: 75 BPM | RESPIRATION RATE: 17 BRPM | SYSTOLIC BLOOD PRESSURE: 98 MMHG | DIASTOLIC BLOOD PRESSURE: 77 MMHG

## 2020-08-11 VITALS
SYSTOLIC BLOOD PRESSURE: 118 MMHG | RESPIRATION RATE: 16 BRPM | HEART RATE: 83 BPM | OXYGEN SATURATION: 97 % | DIASTOLIC BLOOD PRESSURE: 70 MMHG

## 2020-08-11 DIAGNOSIS — K08.409 PARTIAL LOSS OF TEETH, UNSPECIFIED CAUSE, UNSPECIFIED CLASS: Chronic | ICD-10-CM

## 2020-08-11 DIAGNOSIS — Z98.890 OTHER SPECIFIED POSTPROCEDURAL STATES: Chronic | ICD-10-CM

## 2020-08-11 DIAGNOSIS — Z30.2 ENCOUNTER FOR STERILIZATION: ICD-10-CM

## 2020-08-11 DIAGNOSIS — Z64.1 PROBLEMS RELATED TO MULTIPARITY: ICD-10-CM

## 2020-08-11 PROCEDURE — 88302 TISSUE EXAM BY PATHOLOGIST: CPT | Mod: 26

## 2020-08-11 PROCEDURE — 88302 TISSUE EXAM BY PATHOLOGIST: CPT

## 2020-08-11 PROCEDURE — 58661 LAPAROSCOPY REMOVE ADNEXA: CPT

## 2020-08-11 RX ORDER — FENTANYL CITRATE 50 UG/ML
50 INJECTION INTRAVENOUS
Refills: 0 | Status: DISCONTINUED | OUTPATIENT
Start: 2020-08-11 | End: 2020-08-11

## 2020-08-11 RX ORDER — SODIUM CHLORIDE 9 MG/ML
1000 INJECTION, SOLUTION INTRAVENOUS
Refills: 0 | Status: DISCONTINUED | OUTPATIENT
Start: 2020-08-11 | End: 2020-08-11

## 2020-08-11 RX ORDER — ONDANSETRON 8 MG/1
4 TABLET, FILM COATED ORAL ONCE
Refills: 0 | Status: DISCONTINUED | OUTPATIENT
Start: 2020-08-11 | End: 2020-08-11

## 2020-08-11 RX ORDER — SODIUM CHLORIDE 9 MG/ML
3 INJECTION INTRAMUSCULAR; INTRAVENOUS; SUBCUTANEOUS ONCE
Refills: 0 | Status: COMPLETED | OUTPATIENT
Start: 2020-08-11 | End: 2020-08-11

## 2020-08-11 RX ADMIN — SODIUM CHLORIDE 3 MILLILITER(S): 9 INJECTION INTRAMUSCULAR; INTRAVENOUS; SUBCUTANEOUS at 07:18

## 2020-08-11 NOTE — BRIEF OPERATIVE NOTE - NSICDXBRIEFPOSTOP_GEN_ALL_CORE_FT
POST-OP DIAGNOSIS:  Status post bilateral salpingectomy 11-Aug-2020 09:17:55  Vanda Clark  Multiparity 11-Aug-2020 09:17:38  Vanda Clark

## 2020-08-11 NOTE — ASU DISCHARGE PLAN (ADULT/PEDIATRIC) - CALL YOUR DOCTOR IF YOU HAVE ANY OF THE FOLLOWING:
Nausea and vomiting that does not stop/Numbness, tingling, color or temperature change to extremity/Unable to urinate/Inability to tolerate liquids or foods/Bleeding that does not stop/Wound/Surgical Site with redness, or foul smelling discharge or pus/Pain not relieved by Medications/Swelling that gets worse/Fever greater than (need to indicate Fahrenheit or Celsius)

## 2020-08-11 NOTE — BRIEF OPERATIVE NOTE - OPERATION/FINDINGS
Bulky enlarged uterus, parous cervix, uterus sounded to 9cm.  Grossly normal tubes and ovaries bilaterally. Right ovary with corpus luteal cyst.  Hemostasis after removal of bilateral tubes.

## 2020-08-11 NOTE — BRIEF OPERATIVE NOTE - NSICDXBRIEFPREOP_GEN_ALL_CORE_FT
PRE-OP DIAGNOSIS:  Severe obesity (BMI 35.0-35.9 with comorbidity) 11-Aug-2020 09:17:20  Vanda Clark  Encounter for sterilization 11-Aug-2020 09:16:55  Vanda Clark  Multiparity 11-Aug-2020 09:16:46  Vanda Clark

## 2020-08-11 NOTE — ASU DISCHARGE PLAN (ADULT/PEDIATRIC) - CARE PROVIDER_API CALL
Vanda Clark  OBSTETRICS AND GYNECOLOGY  [address] [place] [address]  Phone: [phone]  Fax: [phone]  Follow Up Time:

## 2020-08-11 NOTE — ASU DISCHARGE PLAN (ADULT/PEDIATRIC) - PAIN MANAGEMENT
(through my office: motrin and percocet)/Prescription called to pharmacy/Prescriptions electronically submitted to pharmacy from doctor's office

## 2020-08-12 LAB — SURGICAL PATHOLOGY STUDY: SIGNIFICANT CHANGE UP

## 2022-02-04 NOTE — H&P PST ADULT - MEDICATION ADMINISTRATION INFO, PROFILE
This is a recent snapshot of the patient's [facility] medical record.  For current drug dose and complete information and questions, call [phone] or In Basket pool, [facility] ([id number])  CSN Number:  [id number]     no concerns

## 2022-03-03 NOTE — H&P PST ADULT - BP NONINVASIVE MEAN (MM HG)
Render In Strict Bullet Format?: No
Detail Level: Zone
Continue Regimen: Ketoconazole cream 2%, zinc oxide and mix with Vaseline, nystatin powder
Initiate Treatment: SPF >30
83

## 2022-07-29 NOTE — ASU PATIENT PROFILE, ADULT - PRO ARRIVE FROM
Impression: Combined forms of age-related cataract, bilateral: H25.813. Plan: No treatment currently recommended due to level of vision / lack of functional complaints. Patient will monitor vision changes and contact us with any decrease in vision, will re-evaluate cataract on return visit. home

## 2023-10-06 ENCOUNTER — NON-APPOINTMENT (OUTPATIENT)
Age: 38
End: 2023-10-06

## 2024-01-11 ENCOUNTER — NON-APPOINTMENT (OUTPATIENT)
Age: 39
End: 2024-01-11

## 2024-01-31 NOTE — ASU PATIENT PROFILE, ADULT - PREOP PAIN SCORE
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

## 2024-02-23 ENCOUNTER — OFFICE (OUTPATIENT)
Dept: URBAN - METROPOLITAN AREA CLINIC 63 | Facility: CLINIC | Age: 39
Setting detail: OPHTHALMOLOGY
End: 2024-02-23
Payer: COMMERCIAL

## 2024-02-23 DIAGNOSIS — B58.01: ICD-10-CM

## 2024-02-23 DIAGNOSIS — H40.013: ICD-10-CM

## 2024-02-23 DIAGNOSIS — H35.3211: ICD-10-CM

## 2024-02-23 PROCEDURE — 92014 COMPRE OPH EXAM EST PT 1/>: CPT | Performed by: STUDENT IN AN ORGANIZED HEALTH CARE EDUCATION/TRAINING PROGRAM

## 2024-02-23 PROCEDURE — 92133 CPTRZD OPH DX IMG PST SGM ON: CPT | Performed by: STUDENT IN AN ORGANIZED HEALTH CARE EDUCATION/TRAINING PROGRAM

## 2024-02-23 ASSESSMENT — REFRACTION_AUTOREFRACTION
OS_SPHERE: -6.00
OD_SPHERE: -7.00
OS_AXIS: 165
OD_CYLINDER: -3.00
OD_AXIS: 180
OS_CYLINDER: -3.25

## 2024-02-23 ASSESSMENT — CONFRONTATIONAL VISUAL FIELD TEST (CVF)
OS_FINDINGS: FULL
OD_FINDINGS: FULL

## 2024-02-23 ASSESSMENT — SPHEQUIV_DERIVED
OD_SPHEQUIV: -8.5
OS_SPHEQUIV: -7.625

## 2024-03-20 ENCOUNTER — OFFICE (OUTPATIENT)
Dept: URBAN - METROPOLITAN AREA CLINIC 63 | Facility: CLINIC | Age: 39
Setting detail: OPHTHALMOLOGY
End: 2024-03-20
Payer: COMMERCIAL

## 2024-03-20 DIAGNOSIS — H44.2A1: ICD-10-CM

## 2024-03-20 DIAGNOSIS — H40.013: ICD-10-CM

## 2024-03-20 DIAGNOSIS — B58.01: ICD-10-CM

## 2024-03-20 PROCEDURE — 92134 CPTRZ OPH DX IMG PST SGM RTA: CPT | Performed by: SPECIALIST

## 2024-03-20 PROCEDURE — 92012 INTRM OPH EXAM EST PATIENT: CPT | Performed by: SPECIALIST

## 2024-04-16 ENCOUNTER — NON-APPOINTMENT (OUTPATIENT)
Age: 39
End: 2024-04-16

## 2024-06-19 ENCOUNTER — OFFICE (OUTPATIENT)
Dept: URBAN - METROPOLITAN AREA CLINIC 63 | Facility: CLINIC | Age: 39
Setting detail: OPHTHALMOLOGY
End: 2024-06-19
Payer: COMMERCIAL

## 2024-06-19 DIAGNOSIS — H40.013: ICD-10-CM

## 2024-06-19 DIAGNOSIS — B58.01: ICD-10-CM

## 2024-06-19 DIAGNOSIS — H44.2A1: ICD-10-CM

## 2024-06-19 PROCEDURE — 92134 CPTRZ OPH DX IMG PST SGM RTA: CPT | Performed by: SPECIALIST

## 2024-06-19 PROCEDURE — 92235 FLUORESCEIN ANGRPH MLTIFRAME: CPT | Performed by: SPECIALIST

## 2024-06-19 PROCEDURE — 92012 INTRM OPH EXAM EST PATIENT: CPT | Performed by: SPECIALIST

## 2024-07-02 ENCOUNTER — NON-APPOINTMENT (OUTPATIENT)
Age: 39
End: 2024-07-02

## 2024-07-28 ENCOUNTER — NON-APPOINTMENT (OUTPATIENT)
Age: 39
End: 2024-07-28

## 2025-01-15 ENCOUNTER — NON-APPOINTMENT (OUTPATIENT)
Age: 40
End: 2025-01-15